# Patient Record
Sex: FEMALE | Race: WHITE
[De-identification: names, ages, dates, MRNs, and addresses within clinical notes are randomized per-mention and may not be internally consistent; named-entity substitution may affect disease eponyms.]

---

## 2018-12-13 NOTE — ROOR
________________________________________________________________________________

Patient Name: Rupa Mohamud        Procedure Date: 12/13/2018 12:41 PM

MRN: [id number]                          Account Number: [id number]

YOB: 1940              Age: 78

Room: [facility]                            Gender: Female

Note Status: Finalized                 

________________________________________________________________________________

 

Procedure:           Colonoscopy

Indications:         High risk colon cancer surveillance: Personal history of 

                     colonic polyps, Last colonoscopy: September 2015

Providers:           Sunny PARRA MD

Referring MD:        Johnathon Fatima MD

Requesting Provider: 

Medicines:           Monitored Anesthesia Care

Complications:       No immediate complications.

________________________________________________________________________________

Procedure:           Pre-Anesthesia Assessment:

                     - The heart rate, respiratory rate, oxygen saturations, 

                     blood pressure, adequacy of pulmonary ventilation, and 

                     response to care were monitored throughout the procedure.

                     The Colonoscope was introduced through the anus and 

                     advanced to the cecum, identified by appendiceal orifice 

                     and ileocecal valve. The colonoscopy was technically 

                     difficult and complex due to multiple diverticula in the 

                     colon, a redundant colon and a tortuous colon. Successful 

                     completion of the procedure was aided by changing the 

                     patient to a supine position and using manual pressure. 

                     The patient tolerated the procedure well. The quality of 

                     the bowel preparation was adequate.

                                                                                

Findings:

     The perianal and digital rectal examinations were normal.

     (EXAM: Complete, PREP:Adequate)

     Multiple small and large-mouthed diverticula were found in the sigmoid 

     colon.

     The colon (entire examined portion) was significantly tortuous. Advancing 

     the scope required changing the patient's position and applying abdominal 

     pressure.

     The exam was otherwise without abnormality on direct and retroflexion 

     views.

                                                                                

Impression:          - (EXAM: Complete, PREP:Adequate)

                     - Moderate diverticulosis in the sigmoid colon.

                     - Tortuous colon.

                     - The examination was otherwise normal on direct and 

                     retroflexion views.

                     - No specimens collected.

Recommendation:      - Repeat colonoscopy in 5 years for adenoma surveillance.

                     - Use Pedi-Pedi scope for colonoscopy.

                                                                                

 

Sunny Parra MD

________________

Sunny PARRA MD

12/13/2018 1:19:09 PM

This report has been signed electronically.

Number of Addenda: 0

 

Note Initiated On: 12/13/2018 12:41 PM

Estimated Blood Loss:

     Estimated blood loss: none.

## 2023-08-26 ENCOUNTER — HOSPITAL ENCOUNTER (OUTPATIENT)
Dept: HOSPITAL 53 - M ED | Age: 83
Setting detail: OBSERVATION
LOS: 3 days | Discharge: HOME | End: 2023-08-29
Attending: INTERNAL MEDICINE | Admitting: INTERNAL MEDICINE
Payer: MEDICARE

## 2023-08-26 VITALS — BODY MASS INDEX: 25.77 KG/M2 | WEIGHT: 160.34 LBS | HEIGHT: 66 IN

## 2023-08-26 DIAGNOSIS — R63.0: ICD-10-CM

## 2023-08-26 DIAGNOSIS — Z85.72: ICD-10-CM

## 2023-08-26 DIAGNOSIS — E78.5: ICD-10-CM

## 2023-08-26 DIAGNOSIS — E86.0: ICD-10-CM

## 2023-08-26 DIAGNOSIS — E87.1: Primary | ICD-10-CM

## 2023-08-26 DIAGNOSIS — K22.2: ICD-10-CM

## 2023-08-26 DIAGNOSIS — I10: ICD-10-CM

## 2023-08-26 DIAGNOSIS — K52.9: ICD-10-CM

## 2023-08-26 DIAGNOSIS — R53.1: ICD-10-CM

## 2023-08-26 DIAGNOSIS — Z91.011: ICD-10-CM

## 2023-08-26 DIAGNOSIS — Z88.4: ICD-10-CM

## 2023-08-26 DIAGNOSIS — Z79.899: ICD-10-CM

## 2023-08-26 DIAGNOSIS — R41.0: ICD-10-CM

## 2023-08-26 DIAGNOSIS — Z88.5: ICD-10-CM

## 2023-08-26 LAB
ALBUMIN SERPL BCG-MCNC: 3.6 G/DL (ref 3.2–5.2)
ALP SERPL-CCNC: 65 U/L (ref 46–116)
ALT SERPL W P-5'-P-CCNC: 18 U/L (ref 7–40)
AMPHETAMINES UR QL SCN: NEGATIVE
APAP SERPL-MCNC: < 2 UG/ML (ref 10–20)
AST SERPL-CCNC: 14 U/L (ref ?–34)
BARBITURATES UR QL SCN: NEGATIVE
BASOPHILS # BLD AUTO: 0.1 10^3/UL (ref 0–0.2)
BASOPHILS NFR BLD AUTO: 0.6 % (ref 0–1)
BENZODIAZ UR QL SCN: NEGATIVE
BILIRUB CONJ SERPL-MCNC: 0.1 MG/DL (ref ?–0.4)
BILIRUB SERPL-MCNC: 0.3 MG/DL (ref 0.3–1.2)
BUN SERPL-MCNC: 18 MG/DL (ref 9–23)
BZE UR QL SCN: NEGATIVE
CALCIUM SERPL-MCNC: 9.9 MG/DL (ref 8.3–10.6)
CANNABINOIDS UR QL SCN: NEGATIVE
CHLORIDE SERPL-SCNC: 94 MMOL/L (ref 98–107)
CK MB CFR.DF SERPL CALC: 2.12
CK MB SERPL-MCNC: < 1 NG/ML (ref ?–3.6)
CK SERPL-CCNC: 47 U/L (ref 34–145)
CO2 SERPL-SCNC: 27 MMOL/L (ref 20–31)
CREAT SERPL-MCNC: 0.82 MG/DL (ref 0.55–1.3)
CRP SERPL-MCNC: 0.4 MG/DL (ref ?–1)
EOSINOPHIL # BLD AUTO: 0.1 10^3/UL (ref 0–0.5)
EOSINOPHIL NFR BLD AUTO: 1.6 % (ref 0–3)
ETHANOL SERPL-MCNC: 0 % (ref 0–0.01)
GFR SERPL CREATININE-BSD FRML MDRD: > 60 ML/MIN/{1.73_M2} (ref 32–?)
GLUCOSE SERPL-MCNC: 93 MG/DL (ref 74–106)
HCT VFR BLD AUTO: 45.1 % (ref 36–47)
HGB BLD-MCNC: 15.2 G/DL (ref 12–15.5)
LIPASE SERPL-CCNC: 35 U/L (ref 12–53)
LYMPHOCYTES # BLD AUTO: 1.9 10^3/UL (ref 1.5–5)
LYMPHOCYTES NFR BLD AUTO: 21.5 % (ref 24–44)
MCH RBC QN AUTO: 30.3 PG (ref 27–33)
MCHC RBC AUTO-ENTMCNC: 33.7 G/DL (ref 32–36.5)
MCV RBC AUTO: 89.8 FL (ref 80–96)
METHADONE UR QL SCN: NEGATIVE
MONOCYTES # BLD AUTO: 0.7 10^3/UL (ref 0–0.8)
MONOCYTES NFR BLD AUTO: 8 % (ref 2–8)
NEUTROPHILS # BLD AUTO: 6.1 10^3/UL (ref 1.5–8.5)
NEUTROPHILS NFR BLD AUTO: 68 % (ref 36–66)
OPIATES UR QL SCN: NEGATIVE
OSMOLALITY SERPL: 266 MOSM/KG (ref 280–301)
PCP UR QL SCN: NEGATIVE
PLATELET # BLD AUTO: 288 10^3/UL (ref 150–450)
POTASSIUM 24H UR-SCNC: 37 MMOL/L
POTASSIUM SERPL-SCNC: 4.2 MMOL/L (ref 3.5–5.1)
PROCALCITONIN SERPL-MCNC: 0.09 NG/ML
PROT SERPL-MCNC: 6.7 G/DL (ref 5.7–8.2)
RBC # BLD AUTO: 5.02 10^6/UL (ref 4–5.4)
RSV RNA NPH QL NAA+PROBE: NEGATIVE
SALICYLATES SERPL-MCNC: < 3 MG/DL (ref ?–30)
SODIUM SERPL-SCNC: 128 MMOL/L (ref 136–145)
SODIUM UR-SCNC: 42 MMOL/L
TSH SERPL DL<=0.005 MIU/L-ACNC: 1.59 UIU/ML (ref 0.55–4.78)
WBC # BLD AUTO: 9 10^3/UL (ref 4–10)

## 2023-08-26 PROCEDURE — 87040 BLOOD CULTURE FOR BACTERIA: CPT

## 2023-08-26 PROCEDURE — 97530 THERAPEUTIC ACTIVITIES: CPT

## 2023-08-26 PROCEDURE — 82140 ASSAY OF AMMONIA: CPT

## 2023-08-26 PROCEDURE — 82077 ASSAY SPEC XCP UR&BREATH IA: CPT

## 2023-08-26 PROCEDURE — 97161 PT EVAL LOW COMPLEX 20 MIN: CPT

## 2023-08-26 PROCEDURE — 84133 ASSAY OF URINE POTASSIUM: CPT

## 2023-08-26 PROCEDURE — 84145 PROCALCITONIN (PCT): CPT

## 2023-08-26 PROCEDURE — 84300 ASSAY OF URINE SODIUM: CPT

## 2023-08-26 PROCEDURE — 80307 DRUG TEST PRSMV CHEM ANLYZR: CPT

## 2023-08-26 PROCEDURE — 83935 ASSAY OF URINE OSMOLALITY: CPT

## 2023-08-26 PROCEDURE — 94760 N-INVAS EAR/PLS OXIMETRY 1: CPT

## 2023-08-26 PROCEDURE — 87086 URINE CULTURE/COLONY COUNT: CPT

## 2023-08-26 PROCEDURE — 36415 COLL VENOUS BLD VENIPUNCTURE: CPT

## 2023-08-26 PROCEDURE — 83690 ASSAY OF LIPASE: CPT

## 2023-08-26 PROCEDURE — 80048 BASIC METABOLIC PNL TOTAL CA: CPT

## 2023-08-26 PROCEDURE — 84484 ASSAY OF TROPONIN QUANT: CPT

## 2023-08-26 PROCEDURE — 82550 ASSAY OF CK (CPK): CPT

## 2023-08-26 PROCEDURE — 87631 RESP VIRUS 3-5 TARGETS: CPT

## 2023-08-26 PROCEDURE — 84443 ASSAY THYROID STIM HORMONE: CPT

## 2023-08-26 PROCEDURE — 74176 CT ABD & PELVIS W/O CONTRAST: CPT

## 2023-08-26 PROCEDURE — 93005 ELECTROCARDIOGRAM TRACING: CPT

## 2023-08-26 PROCEDURE — 96372 THER/PROPH/DIAG INJ SC/IM: CPT

## 2023-08-26 PROCEDURE — 83930 ASSAY OF BLOOD OSMOLALITY: CPT

## 2023-08-26 PROCEDURE — 97116 GAIT TRAINING THERAPY: CPT

## 2023-08-26 PROCEDURE — 83735 ASSAY OF MAGNESIUM: CPT

## 2023-08-26 PROCEDURE — 80503 PATH CLIN CONSLTJ SF 5-20: CPT

## 2023-08-26 PROCEDURE — 70450 CT HEAD/BRAIN W/O DYE: CPT

## 2023-08-26 PROCEDURE — 80076 HEPATIC FUNCTION PANEL: CPT

## 2023-08-26 PROCEDURE — 82553 CREATINE MB FRACTION: CPT

## 2023-08-26 PROCEDURE — 71250 CT THORAX DX C-: CPT

## 2023-08-26 PROCEDURE — 86140 C-REACTIVE PROTEIN: CPT

## 2023-08-26 PROCEDURE — 83605 ASSAY OF LACTIC ACID: CPT

## 2023-08-26 PROCEDURE — 93041 RHYTHM ECG TRACING: CPT

## 2023-08-26 PROCEDURE — 96360 HYDRATION IV INFUSION INIT: CPT

## 2023-08-26 PROCEDURE — 85025 COMPLETE CBC W/AUTO DIFF WBC: CPT

## 2023-08-26 PROCEDURE — 80143 DRUG ASSAY ACETAMINOPHEN: CPT

## 2023-08-26 PROCEDURE — 81001 URINALYSIS AUTO W/SCOPE: CPT

## 2023-08-26 PROCEDURE — 85027 COMPLETE CBC AUTOMATED: CPT

## 2023-08-26 PROCEDURE — 83615 LACTATE (LD) (LDH) ENZYME: CPT

## 2023-08-26 PROCEDURE — 99285 EMERGENCY DEPT VISIT HI MDM: CPT

## 2023-08-26 RX ADMIN — OXYCODONE HYDROCHLORIDE AND ACETAMINOPHEN SCH MG: 500 TABLET ORAL at 23:07

## 2023-08-26 RX ADMIN — SODIUM CHLORIDE, POTASSIUM CHLORIDE, SODIUM LACTATE AND CALCIUM CHLORIDE SCH MLS/HR: 600; 310; 30; 20 INJECTION, SOLUTION INTRAVENOUS at 22:23

## 2023-08-26 RX ADMIN — Medication SCH UNITS: at 23:08

## 2023-08-27 VITALS — DIASTOLIC BLOOD PRESSURE: 73 MMHG | SYSTOLIC BLOOD PRESSURE: 143 MMHG | TEMPERATURE: 97.8 F | OXYGEN SATURATION: 97 %

## 2023-08-27 VITALS — SYSTOLIC BLOOD PRESSURE: 165 MMHG | TEMPERATURE: 97.8 F | DIASTOLIC BLOOD PRESSURE: 71 MMHG | OXYGEN SATURATION: 97 %

## 2023-08-27 VITALS — TEMPERATURE: 98 F | OXYGEN SATURATION: 98 % | DIASTOLIC BLOOD PRESSURE: 68 MMHG | SYSTOLIC BLOOD PRESSURE: 155 MMHG

## 2023-08-27 LAB
BUN SERPL-MCNC: 13 MG/DL (ref 9–23)
CALCIUM SERPL-MCNC: 9.5 MG/DL (ref 8.3–10.6)
CHLORIDE SERPL-SCNC: 96 MMOL/L (ref 98–107)
CO2 SERPL-SCNC: 28 MMOL/L (ref 20–31)
CREAT SERPL-MCNC: 0.74 MG/DL (ref 0.55–1.3)
GFR SERPL CREATININE-BSD FRML MDRD: > 60 ML/MIN/{1.73_M2} (ref 32–?)
GLUCOSE SERPL-MCNC: 93 MG/DL (ref 74–106)
HCT VFR BLD AUTO: 43.3 % (ref 36–47)
HGB BLD-MCNC: 14.7 G/DL (ref 12–15.5)
MAGNESIUM SERPL-MCNC: 1.6 MG/DL (ref 1.8–2.4)
MCH RBC QN AUTO: 30.6 PG (ref 27–33)
MCHC RBC AUTO-ENTMCNC: 33.9 G/DL (ref 32–36.5)
MCV RBC AUTO: 90.2 FL (ref 80–96)
PLATELET # BLD AUTO: 261 10^3/UL (ref 150–450)
POTASSIUM SERPL-SCNC: 4.4 MMOL/L (ref 3.5–5.1)
RBC # BLD AUTO: 4.8 10^6/UL (ref 4–5.4)
SODIUM SERPL-SCNC: 129 MMOL/L (ref 136–145)
WBC # BLD AUTO: 9.5 10^3/UL (ref 4–10)

## 2023-08-27 RX ADMIN — ACETAMINOPHEN PRN MG: 500 TABLET ORAL at 10:10

## 2023-08-27 RX ADMIN — ENOXAPARIN SODIUM SCH MG: 40 INJECTION SUBCUTANEOUS at 08:37

## 2023-08-27 RX ADMIN — MAGNESIUM SULFATE IN DEXTROSE SCH MLS/HR: 10 INJECTION, SOLUTION INTRAVENOUS at 08:36

## 2023-08-27 RX ADMIN — SODIUM CHLORIDE TAB 1 GM SCH GM: 1 TAB at 16:31

## 2023-08-27 RX ADMIN — FLUTICASONE PROPIONATE PRN SPRAY: 50 SPRAY, METERED NASAL at 08:35

## 2023-08-27 RX ADMIN — MAGNESIUM SULFATE IN DEXTROSE SCH MLS/HR: 10 INJECTION, SOLUTION INTRAVENOUS at 09:47

## 2023-08-27 RX ADMIN — ACETAMINOPHEN PRN MG: 500 TABLET ORAL at 21:27

## 2023-08-27 RX ADMIN — OXYCODONE HYDROCHLORIDE AND ACETAMINOPHEN SCH MG: 500 TABLET ORAL at 21:21

## 2023-08-27 RX ADMIN — FLUTICASONE PROPIONATE PRN SPRAY: 50 SPRAY, METERED NASAL at 01:45

## 2023-08-27 RX ADMIN — Medication SCH UNITS: at 21:20

## 2023-08-27 RX ADMIN — MAGNESIUM SULFATE IN DEXTROSE SCH MLS/HR: 10 INJECTION, SOLUTION INTRAVENOUS at 11:14

## 2023-08-27 RX ADMIN — SODIUM CHLORIDE TAB 1 GM SCH GM: 1 TAB at 21:21

## 2023-08-27 RX ADMIN — FAMOTIDINE SCH MG: 20 TABLET, FILM COATED ORAL at 21:20

## 2023-08-27 RX ADMIN — MAGNESIUM SULFATE IN DEXTROSE SCH MLS/HR: 10 INJECTION, SOLUTION INTRAVENOUS at 12:41

## 2023-08-27 RX ADMIN — SODIUM CHLORIDE, POTASSIUM CHLORIDE, SODIUM LACTATE AND CALCIUM CHLORIDE SCH MLS/HR: 600; 310; 30; 20 INJECTION, SOLUTION INTRAVENOUS at 16:32

## 2023-08-28 VITALS — SYSTOLIC BLOOD PRESSURE: 158 MMHG | TEMPERATURE: 97.7 F | DIASTOLIC BLOOD PRESSURE: 77 MMHG | OXYGEN SATURATION: 100 %

## 2023-08-28 VITALS — DIASTOLIC BLOOD PRESSURE: 73 MMHG | SYSTOLIC BLOOD PRESSURE: 152 MMHG | TEMPERATURE: 96.9 F | OXYGEN SATURATION: 98 %

## 2023-08-28 VITALS — TEMPERATURE: 97.4 F | DIASTOLIC BLOOD PRESSURE: 69 MMHG | OXYGEN SATURATION: 98 % | SYSTOLIC BLOOD PRESSURE: 159 MMHG

## 2023-08-28 VITALS — SYSTOLIC BLOOD PRESSURE: 159 MMHG | DIASTOLIC BLOOD PRESSURE: 69 MMHG

## 2023-08-28 LAB
BASOPHILS # BLD AUTO: 0.1 10^3/UL (ref 0–0.2)
BASOPHILS NFR BLD AUTO: 0.6 % (ref 0–1)
BUN SERPL-MCNC: 13 MG/DL (ref 9–23)
CALCIUM SERPL-MCNC: 8.9 MG/DL (ref 8.3–10.6)
CHLORIDE SERPL-SCNC: 99 MMOL/L (ref 98–107)
CO2 SERPL-SCNC: 24 MMOL/L (ref 20–31)
CREAT SERPL-MCNC: 0.74 MG/DL (ref 0.55–1.3)
EOSINOPHIL # BLD AUTO: 0.2 10^3/UL (ref 0–0.5)
EOSINOPHIL NFR BLD AUTO: 1.8 % (ref 0–3)
GFR SERPL CREATININE-BSD FRML MDRD: > 60 ML/MIN/{1.73_M2} (ref 32–?)
GLUCOSE SERPL-MCNC: 93 MG/DL (ref 74–106)
HCT VFR BLD AUTO: 42.8 % (ref 36–47)
HGB BLD-MCNC: 14.4 G/DL (ref 12–15.5)
LYMPHOCYTES # BLD AUTO: 1.6 10^3/UL (ref 1.5–5)
LYMPHOCYTES NFR BLD AUTO: 19.1 % (ref 24–44)
MAGNESIUM SERPL-MCNC: 2 MG/DL (ref 1.8–2.4)
MCH RBC QN AUTO: 29.9 PG (ref 27–33)
MCHC RBC AUTO-ENTMCNC: 33.6 G/DL (ref 32–36.5)
MCV RBC AUTO: 89 FL (ref 80–96)
MONOCYTES # BLD AUTO: 0.6 10^3/UL (ref 0–0.8)
MONOCYTES NFR BLD AUTO: 7.5 % (ref 2–8)
NEUTROPHILS # BLD AUTO: 5.8 10^3/UL (ref 1.5–8.5)
NEUTROPHILS NFR BLD AUTO: 70.5 % (ref 36–66)
PLATELET # BLD AUTO: 250 10^3/UL (ref 150–450)
POTASSIUM SERPL-SCNC: 4.2 MMOL/L (ref 3.5–5.1)
RBC # BLD AUTO: 4.81 10^6/UL (ref 4–5.4)
SODIUM SERPL-SCNC: 131 MMOL/L (ref 136–145)
WBC # BLD AUTO: 8.3 10^3/UL (ref 4–10)

## 2023-08-28 RX ADMIN — SODIUM CHLORIDE TAB 1 GM SCH GM: 1 TAB at 15:39

## 2023-08-28 RX ADMIN — SODIUM CHLORIDE, POTASSIUM CHLORIDE, SODIUM LACTATE AND CALCIUM CHLORIDE SCH MLS/HR: 600; 310; 30; 20 INJECTION, SOLUTION INTRAVENOUS at 06:30

## 2023-08-28 RX ADMIN — Medication SCH UNITS: at 20:07

## 2023-08-28 RX ADMIN — SODIUM CHLORIDE TAB 1 GM SCH GM: 1 TAB at 20:07

## 2023-08-28 RX ADMIN — FAMOTIDINE SCH MG: 20 TABLET, FILM COATED ORAL at 20:07

## 2023-08-28 RX ADMIN — SODIUM CHLORIDE TAB 1 GM SCH GM: 1 TAB at 09:25

## 2023-08-28 RX ADMIN — OXYCODONE HYDROCHLORIDE AND ACETAMINOPHEN SCH MG: 500 TABLET ORAL at 20:07

## 2023-08-28 RX ADMIN — ENOXAPARIN SODIUM SCH MG: 40 INJECTION SUBCUTANEOUS at 09:26

## 2023-08-28 RX ADMIN — ACETAMINOPHEN PRN MG: 500 TABLET ORAL at 15:39

## 2023-08-28 RX ADMIN — ACETAMINOPHEN PRN MG: 500 TABLET ORAL at 07:39

## 2023-08-29 VITALS — OXYGEN SATURATION: 97 % | SYSTOLIC BLOOD PRESSURE: 150 MMHG | DIASTOLIC BLOOD PRESSURE: 81 MMHG | TEMPERATURE: 97.6 F

## 2023-08-29 LAB
BUN SERPL-MCNC: 12 MG/DL (ref 9–23)
CALCIUM SERPL-MCNC: 9 MG/DL (ref 8.3–10.6)
CHLORIDE SERPL-SCNC: 103 MMOL/L (ref 98–107)
CO2 SERPL-SCNC: 25 MMOL/L (ref 20–31)
CREAT SERPL-MCNC: 0.79 MG/DL (ref 0.55–1.3)
GFR SERPL CREATININE-BSD FRML MDRD: > 60 ML/MIN/{1.73_M2} (ref 32–?)
GLUCOSE SERPL-MCNC: 93 MG/DL (ref 74–106)
HCT VFR BLD AUTO: 41.4 % (ref 36–47)
HGB BLD-MCNC: 13.8 G/DL (ref 12–15.5)
MCH RBC QN AUTO: 30.2 PG (ref 27–33)
MCHC RBC AUTO-ENTMCNC: 33.3 G/DL (ref 32–36.5)
MCV RBC AUTO: 90.6 FL (ref 80–96)
PLATELET # BLD AUTO: 239 10^3/UL (ref 150–450)
POTASSIUM SERPL-SCNC: 4 MMOL/L (ref 3.5–5.1)
RBC # BLD AUTO: 4.57 10^6/UL (ref 4–5.4)
SODIUM SERPL-SCNC: 136 MMOL/L (ref 136–145)
WBC # BLD AUTO: 7.8 10^3/UL (ref 4–10)

## 2023-08-29 RX ADMIN — SODIUM CHLORIDE TAB 1 GM SCH GM: 1 TAB at 08:30

## 2023-08-29 RX ADMIN — ACETAMINOPHEN PRN MG: 500 TABLET ORAL at 03:25

## 2023-08-29 RX ADMIN — SODIUM CHLORIDE, POTASSIUM CHLORIDE, SODIUM LACTATE AND CALCIUM CHLORIDE SCH MLS/HR: 600; 310; 30; 20 INJECTION, SOLUTION INTRAVENOUS at 03:34

## 2023-08-29 RX ADMIN — ENOXAPARIN SODIUM SCH MG: 40 INJECTION SUBCUTANEOUS at 08:30

## 2024-09-29 ENCOUNTER — HOSPITAL ENCOUNTER (EMERGENCY)
Dept: HOSPITAL 53 - M ED | Age: 84
Discharge: HOME | End: 2024-09-29
Payer: MEDICARE

## 2024-09-29 VITALS — SYSTOLIC BLOOD PRESSURE: 164 MMHG | DIASTOLIC BLOOD PRESSURE: 84 MMHG | OXYGEN SATURATION: 99 % | TEMPERATURE: 97.2 F

## 2024-09-29 VITALS — HEIGHT: 66 IN | WEIGHT: 159.84 LBS | BODY MASS INDEX: 25.69 KG/M2

## 2024-09-29 DIAGNOSIS — Z91.011: ICD-10-CM
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
## 2024-09-29 DIAGNOSIS — Z88.5: ICD-10-CM

## 2024-09-29 DIAGNOSIS — S52.591A: Primary | ICD-10-CM
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
## 2024-09-29 DIAGNOSIS — Z79.1: ICD-10-CM

## 2024-09-29 DIAGNOSIS — Z88.8: ICD-10-CM

## 2024-09-29 DIAGNOSIS — Z79.899: ICD-10-CM

## 2024-09-30 ENCOUNTER — HOSPITAL ENCOUNTER (OUTPATIENT)
Dept: HOSPITAL 53 - M ED | Age: 84
Setting detail: OBSERVATION
LOS: 4 days | Discharge: HOME HEALTH | End: 2024-10-04
Attending: INTERNAL MEDICINE | Admitting: STUDENT IN AN ORGANIZED HEALTH CARE EDUCATION/TRAINING PROGRAM
Payer: MEDICARE

## 2024-09-30 VITALS — WEIGHT: 160.34 LBS | HEIGHT: 66 IN | BODY MASS INDEX: 25.77 KG/M2

## 2024-09-30 DIAGNOSIS — E55.9: ICD-10-CM
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
## 2024-09-30 DIAGNOSIS — K21.9: ICD-10-CM

## 2024-09-30 DIAGNOSIS — S52.551A: Primary | ICD-10-CM

## 2024-09-30 DIAGNOSIS — E78.5: ICD-10-CM
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
## 2024-09-30 DIAGNOSIS — I10: ICD-10-CM

## 2024-09-30 DIAGNOSIS — Z88.5: ICD-10-CM

## 2024-09-30 DIAGNOSIS — Z99.89: ICD-10-CM

## 2024-09-30 DIAGNOSIS — C85.96: ICD-10-CM

## 2024-09-30 DIAGNOSIS — Z88.4: ICD-10-CM

## 2024-09-30 DIAGNOSIS — Z79.899: ICD-10-CM

## 2024-09-30 DIAGNOSIS — M25.531: ICD-10-CM
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
## 2024-09-30 DIAGNOSIS — E87.1: ICD-10-CM
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
## 2024-09-30 DIAGNOSIS — Z91.011: ICD-10-CM

## 2024-09-30 LAB
ALBUMIN SERPL BCG-MCNC: 3.4 G/DL (ref 3.2–5.2)
ALP SERPL-CCNC: 68 U/L (ref 46–116)
ALT SERPL W P-5'-P-CCNC: 21 U/L (ref 7–40)
AST SERPL-CCNC: 22 U/L (ref ?–34)
BASOPHILS # BLD AUTO: 0 10^3/UL (ref 0–0.2)
BASOPHILS NFR BLD AUTO: 0.2 % (ref 0–1)
BILIRUB CONJ SERPL-MCNC: 0.2 MG/DL (ref ?–0.4)
BILIRUB SERPL-MCNC: 0.7 MG/DL (ref 0.3–1.2)
BUN SERPL-MCNC: 14 MG/DL (ref 9–23)
CALCIUM SERPL-MCNC: 9.9 MG/DL (ref 8.3–10.6)
CHLORIDE SERPL-SCNC: 99 MMOL/L (ref 98–107)
CK MB CFR.DF SERPL CALC: 1.49
CK MB SERPL-MCNC: < 1 NG/ML (ref ?–3.6)
CK SERPL-CCNC: 67 U/L (ref 34–145)
CO2 SERPL-SCNC: 27 MMOL/L (ref 20–31)
CREAT SERPL-MCNC: 0.74 MG/DL (ref 0.55–1.3)
EOSINOPHIL # BLD AUTO: 0.1 10^3/UL (ref 0–0.5)
EOSINOPHIL NFR BLD AUTO: 1.2 % (ref 0–3)
GFR SERPL CREATININE-BSD FRML MDRD: > 60 ML/MIN/{1.73_M2} (ref 32–?)
GLUCOSE SERPL-MCNC: 104 MG/DL (ref 74–106)
HCT VFR BLD AUTO: 42.8 % (ref 36–47)
HGB BLD-MCNC: 14.5 G/DL (ref 12–15.5)
LYMPHOCYTES # BLD AUTO: 1.7 10^3/UL (ref 1.5–5)
LYMPHOCYTES NFR BLD AUTO: 18.4 % (ref 24–44)
MCH RBC QN AUTO: 31.7 PG (ref 27–33)
MCHC RBC AUTO-ENTMCNC: 33.9 G/DL (ref 32–36.5)
MCV RBC AUTO: 93.7 FL (ref 80–96)
MONOCYTES # BLD AUTO: 0.7 10^3/UL (ref 0–0.8)
MONOCYTES NFR BLD AUTO: 7.4 % (ref 2–8)
NEUTROPHILS # BLD AUTO: 6.5 10^3/UL (ref 1.5–8.5)
NEUTROPHILS NFR BLD AUTO: 72.4 % (ref 36–66)
PLATELET # BLD AUTO: 260 10^3/UL (ref 150–450)
POTASSIUM SERPL-SCNC: 4.4 MMOL/L (ref 3.5–5.1)
PROT SERPL-MCNC: 6.5 G/DL (ref 5.7–8.2)
RBC # BLD AUTO: 4.57 10^6/UL (ref 4–5.4)
SODIUM SERPL-SCNC: 129 MMOL/L (ref 136–145)
TSH SERPL DL<=0.005 MIU/L-ACNC: 1.69 UIU/ML (ref 0.55–4.78)
WBC # BLD AUTO: 9 10^3/UL (ref 4–10)

## 2024-09-30 PROCEDURE — 99285 EMERGENCY DEPT VISIT HI MDM: CPT

## 2024-09-30 PROCEDURE — 97165 OT EVAL LOW COMPLEX 30 MIN: CPT

## 2024-09-30 PROCEDURE — 80047 BASIC METABLC PNL IONIZED CA: CPT

## 2024-09-30 PROCEDURE — 80048 BASIC METABOLIC PNL TOTAL CA: CPT

## 2024-09-30 PROCEDURE — 76000 FLUOROSCOPY <1 HR PHYS/QHP: CPT

## 2024-09-30 PROCEDURE — 87486 CHLMYD PNEUM DNA AMP PROBE: CPT

## 2024-09-30 PROCEDURE — 36415 COLL VENOUS BLD VENIPUNCTURE: CPT

## 2024-09-30 PROCEDURE — 96376 TX/PRO/DX INJ SAME DRUG ADON: CPT

## 2024-09-30 PROCEDURE — 85025 COMPLETE CBC W/AUTO DIFF WBC: CPT

## 2024-09-30 PROCEDURE — 87581 M.PNEUMON DNA AMP PROBE: CPT

## 2024-09-30 PROCEDURE — 97116 GAIT TRAINING THERAPY: CPT

## 2024-09-30 PROCEDURE — 97530 THERAPEUTIC ACTIVITIES: CPT

## 2024-09-30 PROCEDURE — 97161 PT EVAL LOW COMPLEX 20 MIN: CPT

## 2024-09-30 PROCEDURE — 85027 COMPLETE CBC AUTOMATED: CPT

## 2024-09-30 PROCEDURE — 87633 RESP VIRUS 12-25 TARGETS: CPT

## 2024-09-30 PROCEDURE — 96374 THER/PROPH/DIAG INJ IV PUSH: CPT

## 2024-09-30 PROCEDURE — 96361 HYDRATE IV INFUSION ADD-ON: CPT

## 2024-09-30 PROCEDURE — 87798 DETECT AGENT NOS DNA AMP: CPT

## 2024-09-30 PROCEDURE — 84484 ASSAY OF TROPONIN QUANT: CPT

## 2024-09-30 PROCEDURE — 97535 SELF CARE MNGMENT TRAINING: CPT

## 2024-09-30 PROCEDURE — 80076 HEPATIC FUNCTION PANEL: CPT

## 2024-09-30 PROCEDURE — 25607 OPTX DST RD XARTC FX/EPI SEP: CPT

## 2024-09-30 PROCEDURE — 82550 ASSAY OF CK (CPK): CPT

## 2024-09-30 PROCEDURE — 93005 ELECTROCARDIOGRAM TRACING: CPT

## 2024-09-30 PROCEDURE — 84443 ASSAY THYROID STIM HORMONE: CPT

## 2024-09-30 PROCEDURE — 82553 CREATINE MB FRACTION: CPT

## 2024-09-30 PROCEDURE — 96372 THER/PROPH/DIAG INJ SC/IM: CPT

## 2024-09-30 PROCEDURE — 71045 X-RAY EXAM CHEST 1 VIEW: CPT

## 2024-09-30 RX ADMIN — SODIUM CHLORIDE ONE MLS/HR: 9 INJECTION, SOLUTION INTRAVENOUS at 20:53

## 2024-10-01 VITALS — DIASTOLIC BLOOD PRESSURE: 60 MMHG | TEMPERATURE: 99 F | SYSTOLIC BLOOD PRESSURE: 138 MMHG

## 2024-10-01 VITALS — SYSTOLIC BLOOD PRESSURE: 17 MMHG | TEMPERATURE: 97.9 F | DIASTOLIC BLOOD PRESSURE: 89 MMHG

## 2024-10-01 VITALS — SYSTOLIC BLOOD PRESSURE: 158 MMHG | OXYGEN SATURATION: 98 % | DIASTOLIC BLOOD PRESSURE: 90 MMHG | TEMPERATURE: 97.3 F

## 2024-10-01 LAB
BUN SERPL-MCNC: 13 MG/DL (ref 9–23)
CALCIUM SERPL-MCNC: 9.4 MG/DL (ref 8.3–10.6)
CHLORIDE SERPL-SCNC: 103 MMOL/L (ref 98–107)
CO2 SERPL-SCNC: 24 MMOL/L (ref 20–31)
CREAT SERPL-MCNC: 0.76 MG/DL (ref 0.55–1.3)
GFR SERPL CREATININE-BSD FRML MDRD: > 60 ML/MIN/{1.73_M2} (ref 32–?)
GLUCOSE SERPL-MCNC: 92 MG/DL (ref 74–106)
HCT VFR BLD AUTO: 42.5 % (ref 36–47)
HGB BLD-MCNC: 14 G/DL (ref 12–15.5)
MCH RBC QN AUTO: 31.3 PG (ref 27–33)
MCHC RBC AUTO-ENTMCNC: 32.9 G/DL (ref 32–36.5)
MCV RBC AUTO: 95.1 FL (ref 80–96)
PLATELET # BLD AUTO: 220 10^3/UL (ref 150–450)
POTASSIUM SERPL-SCNC: 4.2 MMOL/L (ref 3.5–5.1)
RBC # BLD AUTO: 4.47 10^6/UL (ref 4–5.4)
SODIUM SERPL-SCNC: 133 MMOL/L (ref 136–145)
WBC # BLD AUTO: 7.9 10^3/UL (ref 4–10)

## 2024-10-01 RX ADMIN — FAMOTIDINE SCH MG: 20 TABLET, FILM COATED ORAL at 21:53

## 2024-10-01 RX ADMIN — FLUTICASONE PROPIONATE SCH SPRAY: 50 SPRAY, METERED NASAL at 21:00

## 2024-10-01 RX ADMIN — Medication SCH UNITS: at 21:53

## 2024-10-01 RX ADMIN — ENOXAPARIN SODIUM SCH MG: 40 INJECTION SUBCUTANEOUS at 09:52

## 2024-10-01 RX ADMIN — OXYCODONE HYDROCHLORIDE AND ACETAMINOPHEN SCH MG: 500 TABLET ORAL at 21:53

## 2024-10-01 RX ADMIN — ACETAMINOPHEN PRN MG: 325 TABLET ORAL at 15:29

## 2024-10-02 VITALS — OXYGEN SATURATION: 97 % | SYSTOLIC BLOOD PRESSURE: 148 MMHG | TEMPERATURE: 97.3 F | DIASTOLIC BLOOD PRESSURE: 79 MMHG

## 2024-10-02 VITALS — OXYGEN SATURATION: 96 % | SYSTOLIC BLOOD PRESSURE: 104 MMHG | TEMPERATURE: 97.5 F | DIASTOLIC BLOOD PRESSURE: 68 MMHG

## 2024-10-02 VITALS — OXYGEN SATURATION: 95 % | TEMPERATURE: 97 F | DIASTOLIC BLOOD PRESSURE: 73 MMHG | SYSTOLIC BLOOD PRESSURE: 148 MMHG

## 2024-10-02 VITALS — OXYGEN SATURATION: 95 % | TEMPERATURE: 97.5 F | SYSTOLIC BLOOD PRESSURE: 150 MMHG | DIASTOLIC BLOOD PRESSURE: 75 MMHG

## 2024-10-02 VITALS — DIASTOLIC BLOOD PRESSURE: 72 MMHG | TEMPERATURE: 97.3 F | OXYGEN SATURATION: 95 % | SYSTOLIC BLOOD PRESSURE: 148 MMHG

## 2024-10-02 VITALS — SYSTOLIC BLOOD PRESSURE: 144 MMHG | DIASTOLIC BLOOD PRESSURE: 91 MMHG | TEMPERATURE: 97.5 F

## 2024-10-02 VITALS — OXYGEN SATURATION: 98 %

## 2024-10-02 VITALS — DIASTOLIC BLOOD PRESSURE: 83 MMHG | TEMPERATURE: 97 F | SYSTOLIC BLOOD PRESSURE: 142 MMHG | OXYGEN SATURATION: 96 %

## 2024-10-02 VITALS — TEMPERATURE: 97 F | OXYGEN SATURATION: 94 % | SYSTOLIC BLOOD PRESSURE: 142 MMHG | DIASTOLIC BLOOD PRESSURE: 71 MMHG

## 2024-10-02 RX ADMIN — ATORVASTATIN CALCIUM SCH MG: 10 TABLET, FILM COATED ORAL at 08:23

## 2024-10-02 RX ADMIN — KETOROLAC TROMETHAMINE ONE MG: 30 INJECTION, SOLUTION INTRAMUSCULAR at 15:00

## 2024-10-02 RX ADMIN — CEFAZOLIN SODIUM SCH MLS/HR: 2 SOLUTION INTRAVENOUS at 21:00

## 2024-10-02 RX ADMIN — HYDROMORPHONE HYDROCHLORIDE PRN MG: 1 INJECTION, SOLUTION INTRAMUSCULAR; INTRAVENOUS; SUBCUTANEOUS at 14:26

## 2024-10-03 VITALS — TEMPERATURE: 97.5 F | SYSTOLIC BLOOD PRESSURE: 120 MMHG | DIASTOLIC BLOOD PRESSURE: 63 MMHG | OXYGEN SATURATION: 96 %

## 2024-10-03 VITALS — SYSTOLIC BLOOD PRESSURE: 130 MMHG | DIASTOLIC BLOOD PRESSURE: 59 MMHG

## 2024-10-03 VITALS — TEMPERATURE: 97.5 F | DIASTOLIC BLOOD PRESSURE: 66 MMHG | SYSTOLIC BLOOD PRESSURE: 137 MMHG | OXYGEN SATURATION: 97 %

## 2024-10-03 VITALS — TEMPERATURE: 97.9 F | SYSTOLIC BLOOD PRESSURE: 130 MMHG | OXYGEN SATURATION: 93 % | DIASTOLIC BLOOD PRESSURE: 59 MMHG

## 2024-10-03 VITALS — TEMPERATURE: 97.7 F | OXYGEN SATURATION: 91 %

## 2024-10-03 VITALS — DIASTOLIC BLOOD PRESSURE: 66 MMHG | OXYGEN SATURATION: 97 % | SYSTOLIC BLOOD PRESSURE: 138 MMHG | TEMPERATURE: 97.5 F

## 2024-10-03 VITALS — DIASTOLIC BLOOD PRESSURE: 70 MMHG | OXYGEN SATURATION: 96 % | SYSTOLIC BLOOD PRESSURE: 142 MMHG | TEMPERATURE: 97.9 F

## 2024-10-04 VITALS — TEMPERATURE: 98.7 F | DIASTOLIC BLOOD PRESSURE: 65 MMHG | SYSTOLIC BLOOD PRESSURE: 136 MMHG | OXYGEN SATURATION: 96 %

## 2024-10-04 VITALS — SYSTOLIC BLOOD PRESSURE: 142 MMHG | DIASTOLIC BLOOD PRESSURE: 77 MMHG | OXYGEN SATURATION: 97 % | TEMPERATURE: 97.5 F

## 2024-10-17 ENCOUNTER — HOSPITAL ENCOUNTER (OUTPATIENT)
Dept: HOSPITAL 53 - M SOG | Age: 84
End: 2024-10-17
Attending: PHYSICIAN ASSISTANT
Payer: MEDICARE
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
## 2024-10-17 DIAGNOSIS — S52.551A: Primary | ICD-10-CM

## 2024-11-14 ENCOUNTER — HOSPITAL ENCOUNTER (OUTPATIENT)
Dept: HOSPITAL 53 - M SOG | Age: 84
End: 2024-11-14
Attending: PHYSICIAN ASSISTANT
Payer: MEDICARE

## 2024-11-14 DIAGNOSIS — Z47.89: ICD-10-CM

## 2024-11-14 DIAGNOSIS — S52.551D: Primary | ICD-10-CM

## 2024-12-12 ENCOUNTER — HOSPITAL ENCOUNTER (OUTPATIENT)
Dept: HOSPITAL 53 - M SOG | Age: 84
End: 2024-12-12
Attending: PHYSICIAN ASSISTANT
Payer: MEDICARE

## 2024-12-12 DIAGNOSIS — Z47.89: ICD-10-CM

## 2024-12-12 DIAGNOSIS — S52.551A: Primary | ICD-10-CM

## 2024-12-12 DIAGNOSIS — Z53.8: ICD-10-CM

## 2025-07-29 ENCOUNTER — HOSPITAL ENCOUNTER (OUTPATIENT)
Dept: HOSPITAL 53 - M WUC | Age: 85
End: 2025-07-29
Attending: NURSE PRACTITIONER
Payer: MEDICARE

## 2025-07-29 DIAGNOSIS — R05.9: Primary | ICD-10-CM
